# Patient Record
Sex: MALE | Race: WHITE | NOT HISPANIC OR LATINO | Employment: FULL TIME | URBAN - METROPOLITAN AREA
[De-identification: names, ages, dates, MRNs, and addresses within clinical notes are randomized per-mention and may not be internally consistent; named-entity substitution may affect disease eponyms.]

---

## 2023-01-07 ENCOUNTER — OFFICE VISIT (OUTPATIENT)
Dept: URGENT CARE | Facility: CLINIC | Age: 38
End: 2023-01-07
Payer: MEDICAID

## 2023-01-07 VITALS
OXYGEN SATURATION: 97 % | SYSTOLIC BLOOD PRESSURE: 118 MMHG | HEIGHT: 70 IN | RESPIRATION RATE: 18 BRPM | HEART RATE: 57 BPM | DIASTOLIC BLOOD PRESSURE: 56 MMHG | BODY MASS INDEX: 21.95 KG/M2 | TEMPERATURE: 97.7 F | WEIGHT: 153.3 LBS

## 2023-01-07 DIAGNOSIS — R19.06 EPIGASTRIC MASS: ICD-10-CM

## 2023-01-07 PROCEDURE — 99213 OFFICE O/P EST LOW 20 MIN: CPT

## 2023-01-08 NOTE — PROGRESS NOTES
Subjective:   Alex Wagner is a 37 y.o. male who presents for Abdominal Pain (X1 month Upper abdominal  Lump/pain/diarrhea/weight loss)      HPI:    Patient presents to urgent care with concerns of lump on midline of his upper abdomen.  Patient states he twisted wrong about a month ago and developed a sharp pain in his stomach and then a mass appeared.  Patient also reports he has been having soft brown stools, weight loss of approximately 10 pounds over the last month as well.  Patient states his anxiety levels have been high due to presence of mass and he has a fear of going to the doctor's office.   Patient denies pain, radiation of symptoms through abdomen to back, thrill.  Denies fever, chills, night sweats, nausea, vomiting.  Denies CP, shortness of breath, dizziness, palpitations, leg swelling, cough  Denies dysuria, urinary frequency, urinary retention, hematuria. Denies history of kidney stones.  Denies bright red blood per rectum, maroon-colored stools, black tarry stools, mucus in stool.    Denies heartburn, vomiting.  Denies decreased flatus, belching, distension.   Denies family history of sudden cardiac death, abdominal aneurysms.     ROS As above in HPI    Medications:    No current outpatient medications on file prior to visit.     No current facility-administered medications on file prior to visit.        Allergies:   Pepto-bismol    Problem List:   There is no problem list on file for this patient.       Surgical History:  No past surgical history on file.    Past Social Hx:   Social History     Tobacco Use    Smoking status: Never    Smokeless tobacco: Never   Vaping Use    Vaping Use: Never used   Substance Use Topics    Alcohol use: Never    Drug use: Yes     Types: Marijuana          Problem list, medications, and allergies reviewed by myself today in Epic.     Objective:     /56 (BP Location: Left arm, Patient Position: Sitting)   Pulse (!) 57   Temp 36.5 °C (97.7 °F)  "(Temporal)   Resp 18   Ht 1.778 m (5' 10\")   Wt 69.5 kg (153 lb 4.8 oz)   SpO2 97%   BMI 22.00 kg/m²     Physical Exam  Vitals and nursing note reviewed.   Constitutional:       General: He is not in acute distress.     Appearance: Normal appearance. He is not ill-appearing or diaphoretic.   HENT:      Head: Normocephalic and atraumatic.   Eyes:      Conjunctiva/sclera: Conjunctivae normal.   Cardiovascular:      Rate and Rhythm: Regular rhythm. Bradycardia present.      Heart sounds: Normal heart sounds. No murmur heard.    No friction rub. No gallop.   Pulmonary:      Effort: Pulmonary effort is normal.      Breath sounds: Normal breath sounds.   Abdominal:      General: Abdomen is flat. Bowel sounds are normal. There is no distension.      Palpations: Abdomen is soft. There is mass. There is no hepatomegaly, splenomegaly or pulsatile mass.      Tenderness: There is no abdominal tenderness. There is no right CVA tenderness, left CVA tenderness, guarding or rebound.      Hernia: No hernia is present.          Comments: Small oval-shaped mass midline abdomen of epigastric area.  Mass is soft, nontender, slightly mobile.  No thrill or pulsating appreciated.    Skin:     General: Skin is warm and dry.      Capillary Refill: Capillary refill takes less than 2 seconds.      Findings: No rash.   Neurological:      Mental Status: He is alert and oriented to person, place, and time.       Assessment/Plan:     Diagnosis and associated orders:   1. Epigastric mass  - US-ABDOMEN LTD (SOFT TISSUE); Future  - Referral to establish with [facility] PCP        Comments/MDM:     Will obtain US of soft epigastric mass and discuss results with patient.     Pt is clinically stable at today's acute urgent care visit.  No acute distress noted. Appropriate for outpatient management at this time.       Discussed DDx, management options (risks,benefits, and alternatives to planned treatment), natural progression and supportive care.  " Expressed understanding and the treatment plan was agreed upon. Questions were encouraged and answered   Return to urgent care prn if new or worsening sx or if there is no improvement in condition prn.    Educated in Red flags and indications to immediately call 911 or present to the Emergency Department.          Please note that this dictation was created using voice recognition software. I have made a reasonable attempt to correct obvious errors, but I expect that there are errors of grammar and possibly content that I did not discover before finalizing the note.    This note was electronically signed by Yasemin Connolly, DNP

## 2023-01-09 ENCOUNTER — HOSPITAL ENCOUNTER (OUTPATIENT)
Dept: RADIOLOGY | Facility: MEDICAL CENTER | Age: 38
End: 2023-01-09
Payer: MEDICAID

## 2023-01-09 DIAGNOSIS — R19.06 EPIGASTRIC MASS: ICD-10-CM

## 2023-01-09 PROCEDURE — 76705 ECHO EXAM OF ABDOMEN: CPT

## 2023-01-11 ENCOUNTER — TELEPHONE (OUTPATIENT)
Dept: SCHEDULING | Facility: IMAGING CENTER | Age: 38
End: 2023-01-11

## 2023-02-01 ENCOUNTER — OFFICE VISIT (OUTPATIENT)
Dept: MEDICAL GROUP | Facility: CLINIC | Age: 38
End: 2023-02-01
Payer: MEDICAID

## 2023-02-01 VITALS
TEMPERATURE: 97.3 F | RESPIRATION RATE: 16 BRPM | HEART RATE: 57 BPM | BODY MASS INDEX: 22.05 KG/M2 | WEIGHT: 154 LBS | HEIGHT: 70 IN | OXYGEN SATURATION: 97 % | DIASTOLIC BLOOD PRESSURE: 77 MMHG | SYSTOLIC BLOOD PRESSURE: 121 MMHG

## 2023-02-01 DIAGNOSIS — K46.9 NON-RECURRENT ABDOMINAL HERNIA WITHOUT OBSTRUCTION OR GANGRENE, UNSPECIFIED HERNIA TYPE: ICD-10-CM

## 2023-02-01 DIAGNOSIS — Z00.00 HEALTHCARE MAINTENANCE: ICD-10-CM

## 2023-02-01 PROCEDURE — 99203 OFFICE O/P NEW LOW 30 MIN: CPT | Mod: GE

## 2023-02-01 RX ORDER — CICLOPIROX 80 MG/ML
SOLUTION TOPICAL
Refills: 3 | COMMUNITY
Start: 2023-02-01

## 2023-02-02 NOTE — PROGRESS NOTES
"Subjective:     CC: Abdominal wall hernia, nail fungus    HPI:   Alex presents today to establish care and with multiple complaints.  Patient reports he was recently seen at urgent care for a small mass that he could palpate on his abdomen above in [facility].  In review of urgent care visit patient had abdominal ultrasound performed which indicated mass was a abdominal hernia.  Patient at this time is requesting a referral to general surgery for further evaluation.  He states he is a  is and is constantly lifting heavy objects and is worried that the hernia will grow in size and worsen.  At this time he denies significant pain, erythema, or difficulty reducing the hernia.  Patient also reports longstanding history of nail fungus of his right toe.  He states he previously was homeless and had \"swamp foot\".  At this time is a  he wears steel toed shoes constantly and his feet tend to get wet.  He was wondering if he can have further treatment for this.  Patient also is here to establish care he states he has never had labs or imaging done previously.  He states family history of hypothyroidism and breast cancer but no further medical problems.  He denies current medical problems but states he had asthma as a kid in which he used an albuterol inhaler previously.  When asked about his breathing or wheezing he states he sometimes has increased work of breathing at work during high aerobic activity. He does not want to attempt an albuterol inhaler again.    Current Outpatient Medications Ordered in Epic   Medication Sig Dispense Refill    ciclopirox (PENLAC) 8 % solution Apply to affected nail nightly, clean nail with alcohol every 7 days. For a year  3     No current [facility]-ordered facility-administered medications on file.       ROS:  Gen: no fevers/chills, no changes in weight  Pulm: no sob, no cough  CV: no chest pain, no palpitations  GI: no nausea/vomiting, no " "diarrhea    Objective:     Exam:  /77 (BP Location: Right arm, Patient Position: Sitting, BP Cuff Size: Adult)   Pulse (!) 57   Temp 36.3 °C (97.3 °F) (Temporal)   Resp 16   Ht 1.778 m (5' 10\")   Wt 69.9 kg (154 lb)   SpO2 97%   BMI 22.10 kg/m²  Body mass index is 22.1 kg/m².    Gen: Alert and oriented, No apparent distress.  Neck: Neck is supple without lymphadenopathy.  Lungs: Normal effort, CTA bilaterally, no wheezes, rhonchi, or rales  CV: Regular rate and rhythm. No murmurs, rubs, or gallops.  Ext: Yellowing and malformation of the nail of the fourth right toe, No clubbing, cyanosis, edema.    Labs: None     Assessment & Plan:     37 y.o. male with the following -     1. Healthcare maintenance  Ordered   - HEMOGLOBIN A1C; Future  - Comp Metabolic Panel; Future  - Lipid Profile; Future     2. Onychomycosis   Yellowing and malformation of the nail of the fourth right toe. Pt currently wears steel toed shoes at work. I discuss with patient can treat with Penlac and tea tree oil. If no improvement can try further treatments in the future    3. Abdominal hernia   Patient has known abdominal hernia. Would like referral to surgery for further treatment.   General surgery referral placed       France Perez M.D.  PGY1         "

## 2023-03-29 ENCOUNTER — APPOINTMENT (OUTPATIENT)
Dept: SURGICAL ONCOLOGY | Facility: MEDICAL CENTER | Age: 38
End: 2023-03-29
Payer: MEDICAID